# Patient Record
Sex: MALE | Race: WHITE | HISPANIC OR LATINO | URBAN - METROPOLITAN AREA
[De-identification: names, ages, dates, MRNs, and addresses within clinical notes are randomized per-mention and may not be internally consistent; named-entity substitution may affect disease eponyms.]

---

## 2017-08-16 ENCOUNTER — EMERGENCY (EMERGENCY)
Facility: HOSPITAL | Age: 22
LOS: 1 days | Discharge: HOME | End: 2017-08-16
Attending: EMERGENCY MEDICINE | Admitting: EMERGENCY MEDICINE
Payer: COMMERCIAL

## 2017-08-16 VITALS
DIASTOLIC BLOOD PRESSURE: 74 MMHG | SYSTOLIC BLOOD PRESSURE: 112 MMHG | HEART RATE: 84 BPM | TEMPERATURE: 98 F | RESPIRATION RATE: 16 BRPM | OXYGEN SATURATION: 100 %

## 2017-08-16 VITALS
SYSTOLIC BLOOD PRESSURE: 103 MMHG | HEART RATE: 60 BPM | RESPIRATION RATE: 18 BRPM | DIASTOLIC BLOOD PRESSURE: 61 MMHG | OXYGEN SATURATION: 100 %

## 2017-08-16 PROCEDURE — 73620 X-RAY EXAM OF FOOT: CPT | Mod: 26,LT

## 2017-08-16 PROCEDURE — 73610 X-RAY EXAM OF ANKLE: CPT | Mod: 26,LT

## 2017-08-16 PROCEDURE — 99284 EMERGENCY DEPT VISIT MOD MDM: CPT

## 2017-08-16 NOTE — ED PROVIDER NOTE - LATERALITY
Medical Assistant Note:  Tomas Grey presents today for blood draw.    Patient seen by provider today: No.   present during visit today: Not Applicable.    Concerns: No Concerns.    Procedure:  Labs drawn: yes.    Post Assessment:  Labs drawn without difficulty: Yes.    Discharge Plan:  Patient discharged in stable condition accompanied by: noe Wang MA             left

## 2017-08-16 NOTE — ED PROVIDER NOTE - CARE PLAN
Principal Discharge DX:	Ankle pain  Instructions for follow-up, activity and diet:	Rest, drink plenty of fluids.  Advance activity as tolerated.  Continue all previously prescribed medications as directed. You can use motrin 600mg every 6-8 hours for pain or fever, and/or Tylenol 650 mg every 4 hours for pain/fever. Follow up with your primary care physician in 48-72 hours- bring copies of your results.  Return to the emergency department for chest pain, shortness of breath, dizziness, or worsening, concerning, or persistent symptoms.

## 2017-08-16 NOTE — ED ADULT TRIAGE NOTE - CHIEF COMPLAINT QUOTE
Pt states he sprained his left ankle 2 weeks ago and c/o discomfort and continued swelling, didn't have an xray at the time. Ambulatory without difficulty

## 2017-08-16 NOTE — ED PROVIDER NOTE - OBJECTIVE STATEMENT
22 y/o M w/ no significant PMHx, presents to the ED c/o left foot and ankle pain x2 weeks. Pt states 2 weeks ago he sprained his ankle while playing basketball and decided to treat it himself w/ use of ace wrap and ice. Pt notes when he wrapped his ankle he had left side left foot pain. Pt presents today w/ persistent left ankle pain. Denies numbness/tingling or any other complaints. NKDA.

## 2017-08-16 NOTE — ED PROVIDER NOTE - LOWER EXTREMITY EXAM, LEFT
swelling over left lateral malleolus, non tender, 2+ D.P. pulses, non tender knee, tenderness over 5th metatarsal dorsum, no skin changes, no ecchymosis

## 2017-11-17 ENCOUNTER — EMERGENCY (EMERGENCY)
Facility: HOSPITAL | Age: 22
LOS: 1 days | Discharge: HOME | End: 2017-11-17
Attending: EMERGENCY MEDICINE | Admitting: EMERGENCY MEDICINE
Payer: COMMERCIAL

## 2017-11-17 VITALS
DIASTOLIC BLOOD PRESSURE: 60 MMHG | TEMPERATURE: 98 F | OXYGEN SATURATION: 100 % | HEART RATE: 65 BPM | SYSTOLIC BLOOD PRESSURE: 102 MMHG | RESPIRATION RATE: 18 BRPM

## 2017-11-17 LAB
ALBUMIN SERPL ELPH-MCNC: 4.7 G/DL — SIGNIFICANT CHANGE UP (ref 3.3–5)
ALP SERPL-CCNC: 48 U/L — SIGNIFICANT CHANGE UP (ref 40–120)
ALT FLD-CCNC: 21 U/L — SIGNIFICANT CHANGE UP (ref 4–41)
APPEARANCE UR: CLEAR — SIGNIFICANT CHANGE UP
AST SERPL-CCNC: 16 U/L — SIGNIFICANT CHANGE UP (ref 4–40)
BACTERIA # UR AUTO: SIGNIFICANT CHANGE UP
BASOPHILS # BLD AUTO: 0.06 K/UL — SIGNIFICANT CHANGE UP (ref 0–0.2)
BASOPHILS NFR BLD AUTO: 1.4 % — SIGNIFICANT CHANGE UP (ref 0–2)
BILIRUB SERPL-MCNC: 0.6 MG/DL — SIGNIFICANT CHANGE UP (ref 0.2–1.2)
BILIRUB UR-MCNC: NEGATIVE — SIGNIFICANT CHANGE UP
BLOOD UR QL VISUAL: NEGATIVE — SIGNIFICANT CHANGE UP
BUN SERPL-MCNC: 15 MG/DL — SIGNIFICANT CHANGE UP (ref 7–23)
CALCIUM SERPL-MCNC: 8.9 MG/DL — SIGNIFICANT CHANGE UP (ref 8.4–10.5)
CHLORIDE SERPL-SCNC: 103 MMOL/L — SIGNIFICANT CHANGE UP (ref 98–107)
CO2 SERPL-SCNC: 26 MMOL/L — SIGNIFICANT CHANGE UP (ref 22–31)
COLOR SPEC: YELLOW — SIGNIFICANT CHANGE UP
CREAT SERPL-MCNC: 1.03 MG/DL — SIGNIFICANT CHANGE UP (ref 0.5–1.3)
EOSINOPHIL # BLD AUTO: 0.06 K/UL — SIGNIFICANT CHANGE UP (ref 0–0.5)
EOSINOPHIL NFR BLD AUTO: 1.4 % — SIGNIFICANT CHANGE UP (ref 0–6)
GLUCOSE SERPL-MCNC: 82 MG/DL — SIGNIFICANT CHANGE UP (ref 70–99)
GLUCOSE UR-MCNC: NEGATIVE — SIGNIFICANT CHANGE UP
HCT VFR BLD CALC: 42.8 % — SIGNIFICANT CHANGE UP (ref 39–50)
HGB BLD-MCNC: 14.1 G/DL — SIGNIFICANT CHANGE UP (ref 13–17)
IMM GRANULOCYTES # BLD AUTO: 0 # — SIGNIFICANT CHANGE UP
IMM GRANULOCYTES NFR BLD AUTO: 0 % — SIGNIFICANT CHANGE UP (ref 0–1.5)
KETONES UR-MCNC: NEGATIVE — SIGNIFICANT CHANGE UP
LEUKOCYTE ESTERASE UR-ACNC: NEGATIVE — SIGNIFICANT CHANGE UP
LIDOCAIN IGE QN: 23.9 U/L — SIGNIFICANT CHANGE UP (ref 7–60)
LYMPHOCYTES # BLD AUTO: 1.72 K/UL — SIGNIFICANT CHANGE UP (ref 1–3.3)
LYMPHOCYTES # BLD AUTO: 40.4 % — SIGNIFICANT CHANGE UP (ref 13–44)
MCHC RBC-ENTMCNC: 30.6 PG — SIGNIFICANT CHANGE UP (ref 27–34)
MCHC RBC-ENTMCNC: 32.9 % — SIGNIFICANT CHANGE UP (ref 32–36)
MCV RBC AUTO: 92.8 FL — SIGNIFICANT CHANGE UP (ref 80–100)
MONOCYTES # BLD AUTO: 0.42 K/UL — SIGNIFICANT CHANGE UP (ref 0–0.9)
MONOCYTES NFR BLD AUTO: 9.9 % — SIGNIFICANT CHANGE UP (ref 2–14)
MUCOUS THREADS # UR AUTO: SIGNIFICANT CHANGE UP
NEUTROPHILS # BLD AUTO: 2 K/UL — SIGNIFICANT CHANGE UP (ref 1.8–7.4)
NEUTROPHILS NFR BLD AUTO: 46.9 % — SIGNIFICANT CHANGE UP (ref 43–77)
NITRITE UR-MCNC: NEGATIVE — SIGNIFICANT CHANGE UP
NRBC # FLD: 0 — SIGNIFICANT CHANGE UP
PH UR: 6 — SIGNIFICANT CHANGE UP (ref 4.6–8)
PLATELET # BLD AUTO: 217 K/UL — SIGNIFICANT CHANGE UP (ref 150–400)
PMV BLD: 10.9 FL — SIGNIFICANT CHANGE UP (ref 7–13)
POTASSIUM SERPL-MCNC: 4.5 MMOL/L — SIGNIFICANT CHANGE UP (ref 3.5–5.3)
POTASSIUM SERPL-SCNC: 4.5 MMOL/L — SIGNIFICANT CHANGE UP (ref 3.5–5.3)
PROT SERPL-MCNC: 6.9 G/DL — SIGNIFICANT CHANGE UP (ref 6–8.3)
PROT UR-MCNC: 10 — SIGNIFICANT CHANGE UP
RBC # BLD: 4.61 M/UL — SIGNIFICANT CHANGE UP (ref 4.2–5.8)
RBC # FLD: 11.9 % — SIGNIFICANT CHANGE UP (ref 10.3–14.5)
RBC CASTS # UR COMP ASSIST: SIGNIFICANT CHANGE UP (ref 0–?)
SODIUM SERPL-SCNC: 139 MMOL/L — SIGNIFICANT CHANGE UP (ref 135–145)
SP GR SPEC: 1.03 — SIGNIFICANT CHANGE UP (ref 1–1.03)
UROBILINOGEN FLD QL: NORMAL E.U. — SIGNIFICANT CHANGE UP (ref 0.1–0.2)
WBC # BLD: 4.26 K/UL — SIGNIFICANT CHANGE UP (ref 3.8–10.5)
WBC # FLD AUTO: 4.26 K/UL — SIGNIFICANT CHANGE UP (ref 3.8–10.5)
WBC UR QL: SIGNIFICANT CHANGE UP (ref 0–?)

## 2017-11-17 PROCEDURE — 99284 EMERGENCY DEPT VISIT MOD MDM: CPT | Mod: 25

## 2017-11-17 PROCEDURE — 93010 ELECTROCARDIOGRAM REPORT: CPT

## 2017-11-17 RX ORDER — FAMOTIDINE 10 MG/ML
20 INJECTION INTRAVENOUS ONCE
Qty: 0 | Refills: 0 | Status: DISCONTINUED | OUTPATIENT
Start: 2017-11-17 | End: 2017-11-17

## 2017-11-17 RX ORDER — FAMOTIDINE 10 MG/ML
20 INJECTION INTRAVENOUS ONCE
Qty: 0 | Refills: 0 | Status: COMPLETED | OUTPATIENT
Start: 2017-11-17 | End: 2017-11-17

## 2017-11-17 RX ORDER — OMEPRAZOLE 10 MG/1
1 CAPSULE, DELAYED RELEASE ORAL
Qty: 28 | Refills: 0 | OUTPATIENT
Start: 2017-11-17 | End: 2017-12-15

## 2017-11-17 RX ORDER — LIDOCAINE 4 G/100G
15 CREAM TOPICAL ONCE
Qty: 0 | Refills: 0 | Status: COMPLETED | OUTPATIENT
Start: 2017-11-17 | End: 2017-11-17

## 2017-11-17 RX ADMIN — FAMOTIDINE 20 MILLIGRAM(S): 10 INJECTION INTRAVENOUS at 15:30

## 2017-11-17 RX ADMIN — LIDOCAINE 15 MILLILITER(S): 4 CREAM TOPICAL at 15:06

## 2017-11-17 RX ADMIN — Medication 30 MILLILITER(S): at 15:06

## 2017-11-17 NOTE — ED ADULT TRIAGE NOTE - CHIEF COMPLAINT QUOTE
intermittent upper abdominal pain for past week, relieved with eating food then it comes back.  Denies n/v/d.  BM yesterday, normal.  Denies trauma/injury to area.

## 2017-11-17 NOTE — ED PROVIDER NOTE - OBJECTIVE STATEMENT
23yo M with no PMH presents with epigastric pain 1 week. intermittent, 7/10 pain at worst, now 1/10. Did not take anything for the pain. worse when lying down during the night, better with food. normal BM, daily. today had BM normal in color and consistency. denies dysuria. only social drinking 2/week, smokes marijuana socially. no fevers, chills.

## 2017-11-17 NOTE — ED PROVIDER NOTE - MEDICAL DECISION MAKING DETAILS
M[place] PGY1: 21yo m with no PMH presenting with epigastric pain 1 week. intermittent. ddx gastric ulcer vs GERD vs pancreatitis. will get labs, dc with f/u to GI

## 2017-11-17 NOTE — ED PROVIDER NOTE - ATTENDING CONTRIBUTION TO CARE
Dr. Emmanuel: I have personally performed a face to face bedside history and physical examination of this patient. I have discussed the history, examination, review of systems, assessment and plan of management with the resident. I have reviewed the electronic medical record and amended it to reflect my history, review of systems, physical exam, assessment and plan.  22M no PMHx p/w 1 week of epigastric pain, non radiating, relieved by eating. No nausea, vomiting, fevers, chills, constipation, diarrhea, dysuria, hematuria. Pain lasts 45 mins. Has not taken anything for pain. +smoker, social drinker. No personal or fam h/o gallstones.  On exam pt appears well, nad, pink conjunctivae, rrr, ctab, no abdo ttp, neg murphys.  Plan - smoking cessation counselling, labs, reassess, appy return instructions given. Cholecystitis unlikely based on history and physical, no US indicated at this time.

## 2017-11-17 NOTE — ED ADULT NURSE NOTE - OBJECTIVE STATEMENT
A&Ox4, respirations even and unlabored, skin warm and dry good for color, ambulatory at baseline. patient reports intermittent epigastric pain x 1 week. Denies chest pain, SOB, LOC, nausea or vomiting. Currently denies pain at time. Denies Hx.

## 2017-11-25 RX ORDER — OMEPRAZOLE 10 MG/1
1 CAPSULE, DELAYED RELEASE ORAL
Qty: 28 | Refills: 0 | OUTPATIENT
Start: 2017-11-25 | End: 2017-12-23

## 2017-11-25 NOTE — ED POST DISCHARGE NOTE - REASON FOR FOLLOW-UP
Other Pt contacted ED.  Rx was not sent to pharmacy.  ERX for omeprazole sent to [facility] pharmacy.

## 2020-06-12 NOTE — ED ADULT TRIAGE NOTE - AS O2 DELIVERY
From: Jhonny Payton  To: Sarai Hussein NP  Sent: 6/12/2020 4:29 PM CDT  Subject: Lab Test or Test Related Question    Chico Fuentes,  I see the x-ray result states that there is nothing wrong with my ankle, but the pain that I am having is not from the ankle but rather the back of the heel, about one or two inches up from the bottom. I know this is an initial report, but I am concerned if anything was found at the back area of the heel...possibly heel bone or bottom of achilles tendon.  Thank you.  Jhonny Payton   room air

## 2022-03-20 ENCOUNTER — EMERGENCY (EMERGENCY)
Facility: HOSPITAL | Age: 27
LOS: 1 days | Discharge: HOME | End: 2022-03-20
Attending: EMERGENCY MEDICINE | Admitting: EMERGENCY MEDICINE
Payer: COMMERCIAL

## 2022-03-20 VITALS
HEART RATE: 79 BPM | SYSTOLIC BLOOD PRESSURE: 125 MMHG | DIASTOLIC BLOOD PRESSURE: 74 MMHG | RESPIRATION RATE: 16 BRPM | TEMPERATURE: 98 F | OXYGEN SATURATION: 100 %

## 2022-03-20 VITALS
SYSTOLIC BLOOD PRESSURE: 126 MMHG | RESPIRATION RATE: 17 BRPM | HEART RATE: 73 BPM | OXYGEN SATURATION: 100 % | TEMPERATURE: 98 F | DIASTOLIC BLOOD PRESSURE: 78 MMHG

## 2022-03-20 LAB
APPEARANCE UR: CLEAR — SIGNIFICANT CHANGE UP
BILIRUB UR-MCNC: NEGATIVE — SIGNIFICANT CHANGE UP
COLOR SPEC: SIGNIFICANT CHANGE UP
DIFF PNL FLD: NEGATIVE — SIGNIFICANT CHANGE UP
GLUCOSE UR QL: NEGATIVE — SIGNIFICANT CHANGE UP
HIV 1+2 AB+HIV1 P24 AG SERPL QL IA: SIGNIFICANT CHANGE UP
KETONES UR-MCNC: NEGATIVE — SIGNIFICANT CHANGE UP
LEUKOCYTE ESTERASE UR-ACNC: NEGATIVE — SIGNIFICANT CHANGE UP
NITRITE UR-MCNC: NEGATIVE — SIGNIFICANT CHANGE UP
PH UR: 6.5 — SIGNIFICANT CHANGE UP (ref 5–8)
PROT UR-MCNC: ABNORMAL
SP GR SPEC: 1.03 — SIGNIFICANT CHANGE UP (ref 1–1.05)
UROBILINOGEN FLD QL: SIGNIFICANT CHANGE UP

## 2022-03-20 PROCEDURE — 76870 US EXAM SCROTUM: CPT | Mod: 26

## 2022-03-20 PROCEDURE — 99285 EMERGENCY DEPT VISIT HI MDM: CPT

## 2022-03-20 NOTE — ED PROVIDER NOTE - PHYSICAL EXAMINATION
gen: well appearing  Mentation: AAO x 3  psych: mood appropriate  ENT: airway patent  Eyes: conjunctivae clear bilaterally  Cardio: RRR, no m/r/g  Resp: normal BS b/l  GI: s/nt/nd  : no CVA tenderness, no testicular tenderness or swelling, +cremasteric reflex b/l  Neuro: sensation and motor function intact  Skin: No evidence of rash  MSK: normal movement of all extremities  Lymph/Vasc: no LE edema

## 2022-03-20 NOTE — ED PROVIDER NOTE - OBJECTIVE STATEMENT
25 y/o M with no reported PMH presenting with R testicular pain x2 weeks. Patient states has been intermittent, but associates more after lifting weights and when sitting. Patient was seen at urgent care few days prior, had UA and STD testing, still pending results. Patient last felt pain prior to coming here, now resolved.  No fevers, chills, testicular swelling, dysuria, hematuria

## 2022-03-20 NOTE — ED PROVIDER NOTE - CLINICAL SUMMARY MEDICAL DECISION MAKING FREE TEXT BOX
BAILEY Ibarra. PGY-3: 27 y/o M presenting with intermittent R testicular/groin pain exam without significant findings, c/f for torsion, epididymitis. Will check UA/UCx, recheck STD screen, testicular US. Patient declining empiric STD treatment and would like to f/u results. Dispo pending w/u

## 2022-03-20 NOTE — ED PROVIDER NOTE - PATIENT PORTAL LINK FT
You can access the FollowMyHealth Patient Portal offered by [facility] by registering at the following website: [email or web address]. By joining [facility]’s FollowMyHealth portal, you will also be able to view your health information using other applications (apps) compatible with our system.

## 2022-03-20 NOTE — ED ADULT TRIAGE NOTE - CHIEF COMPLAINT QUOTE
Patient experiencing mild discomfort in the groin area. Initially, the discomfort started last weekend and self resolved and then, after going to the gym on Thursday the discomfort returned. Denies any urinary sx or discharge.

## 2022-03-20 NOTE — ED PROVIDER NOTE - ATTENDING CONTRIBUTION TO CARE
26M intermittent L test pain x 2 weeks, worse after weightlifting, no swelling in groin or test, worse with sitting.  Feeling normal at present, pain just prior to arrival.  No N/V or hematuria. Was at  and rx'ed ibuprofen and tested for STD, wanted to wait for test results.  No hernias or tenderness.  Plan check urine, GC/chlam, US.  If no sign of infection or torsion, recc scrotal support, NSAIDS, follow up with urology.   VS:  unremarkable    GEN - NAD;   well appearing;   A+O x3   HEAD - NC/AT     ENT - PEERL, EOMI, mucous membranes    moist , no discharge      NECK: Neck supple, non-tender without lymphadenopathy, no masses, no JVD  PULM - CTA b/l,  symmetric breath sounds  COR -  normal heart sounds    ABD - , ND, NT, soft,  BACK - no CVA tenderness, nontender spine     EXTREMS - no edema, no deformity, warm and well perfused    SKIN - no rash    or bruising      NEUROLOGIC - alert, face symmetric, speech fluent, sensation nl, motor no focal deficit.

## 2022-03-20 NOTE — ED ADULT NURSE NOTE - OBJECTIVE STATEMENT
Pt received to room 22. A&Ox4. Ambulatory at baseline. Denies pmh. Pt c/o of R testicular pain occurring for 2x weeks. Pt states pain is intermittent in nature and worsens with lifting weights. Pt denies having experienced this before, but states it may have been r/t pt lifting "the heaviest weight I have squatted at the gym." Pt states pain is nonradiating, 6/10. Vitally stable. No further complaints. Pt resp even unlabored, breath sounds clear anterior and posterior, abd soft nontender, bowel sounds heard in all four quadrants, denies urinary symptoms, pedal pulses 2+ bilaterally.  Denies chest pain, SOB, nausea, vomiting, diarrhea, headache, numbness/tingling, visual disturbances.  Labs collected and sent. Awaiting further orders.

## 2022-03-20 NOTE — ED PROVIDER NOTE - NSFOLLOWUPCLINICS_GEN_ALL_ED_FT
[facility]  Urology  [address] [place] [address]  Phone: [phone]  Fax:

## 2022-03-20 NOTE — ED PROVIDER NOTE - NSFOLLOWUPINSTRUCTIONS_ED_ALL_ED_FT
Scrotal Pain    WHAT YOU NEED TO KNOW:    Scrotal pain can happen at any age. The cause of scrotal pain can range from a minor injury to a serious medical condition. It is very important to seek immediate care if you have scrotal pain. The pain may be a warning sign of a serious condition that will need treatment. Without immediate care, you may be at increased risk for losing a testicle or being sterile (not having children).    DISCHARGE INSTRUCTIONS:    Return to the emergency department if:   •You have any warning signs of a serious problem.  •You have pain or swelling that starts or gets worse quickly.  •You have skin changes in your scrotum, such as a dark patch.  •You have a fever.      Contact your healthcare provider if:   •Your pain does not get better, even after you take pain medicine.  •You have new or worsening pain.  •You have questions or concerns about your condition or care.      Medicines: You may need any of the following:   •Prescription pain medicine may be given. Ask your healthcare provider how to take this medicine safely. Some prescription pain medicines contain acetaminophen. Do not take other medicines that contain acetaminophen without talking to your healthcare provider. Too much acetaminophen may cause liver damage. Prescription pain medicine may cause constipation. Ask your healthcare provider how to prevent or treat constipation.   •NSAIDs, such as ibuprofen, help decrease swelling, pain, and fever. This medicine is available with or without a doctor's order. NSAIDs can cause stomach bleeding or kidney problems in certain people. If you take blood thinner medicine, always ask your healthcare provider if NSAIDs are safe for you. Always read the medicine label and follow directions.  •Antibiotics are used to treat a bacterial infection.  •Take your medicine as directed. Contact your healthcare provider if you think your medicine is not helping or if you have side effects. Tell him or her if you are allergic to any medicine. Keep a list of the medicines, vitamins, and herbs you take. Include the amounts, and when and why you take them. Bring the list or the pill bottles to follow-up visits. Carry your medicine list with you in case of an emergency.      Manage your symptoms:   •Wear a support device, if directed. A support device, such as a jock strap, can help keep your scrotum lifted and supported. This can help decrease pain.  •Apply ice to your scrotum. Ice helps decrease pain and swelling. Use an ice pack, or put crushed ice in a plastic bag. Cover the pack or bag with a towel before you apply it to your scrotum. Apply ice for 15 to 20 minutes every hour, or as directed.      Follow up with your doctor as directed: Write down your questions so you remember to ask them during your visits.

## 2022-03-21 LAB
C TRACH RRNA SPEC QL NAA+PROBE: SIGNIFICANT CHANGE UP
CULTURE RESULTS: SIGNIFICANT CHANGE UP
N GONORRHOEA RRNA SPEC QL NAA+PROBE: SIGNIFICANT CHANGE UP
SPECIMEN SOURCE: SIGNIFICANT CHANGE UP

## 2022-04-13 NOTE — ED ADULT TRIAGE NOTE - MEANS OF ARRIVAL
ambulatory Hydroxychloroquine Counseling:  I discussed with the patient that a baseline ophthalmologic exam is needed at the start of therapy and every year thereafter while on therapy. A CBC may also be warranted for monitoring.  The side effects of this medication were discussed with the patient, including but not limited to agranulocytosis, aplastic anemia, seizures, rashes, retinopathy, and liver toxicity. Patient instructed to call the office should any adverse effect occur.  The patient verbalized understanding of the proper use and possible adverse effects of Plaquenil.  All the patient's questions and concerns were addressed.

## 2022-04-27 NOTE — ED ADULT TRIAGE NOTE - AS TEMP SITE
oral Cimzia Pregnancy And Lactation Text: This medication crosses the placenta but can be considered safe in certain situations. Cimzia may be excreted in breast milk.